# Patient Record
Sex: FEMALE | Race: OTHER | NOT HISPANIC OR LATINO | URBAN - METROPOLITAN AREA
[De-identification: names, ages, dates, MRNs, and addresses within clinical notes are randomized per-mention and may not be internally consistent; named-entity substitution may affect disease eponyms.]

---

## 2017-04-06 ENCOUNTER — OUTPATIENT (OUTPATIENT)
Dept: OUTPATIENT SERVICES | Facility: HOSPITAL | Age: 52
LOS: 1 days | Discharge: HOME | End: 2017-04-06

## 2017-06-27 DIAGNOSIS — R10.11 RIGHT UPPER QUADRANT PAIN: ICD-10-CM

## 2017-06-27 DIAGNOSIS — R53.82 CHRONIC FATIGUE, UNSPECIFIED: ICD-10-CM

## 2017-09-07 ENCOUNTER — OUTPATIENT (OUTPATIENT)
Dept: OUTPATIENT SERVICES | Facility: HOSPITAL | Age: 52
LOS: 1 days | Discharge: HOME | End: 2017-09-07

## 2017-09-08 DIAGNOSIS — R10.11 RIGHT UPPER QUADRANT PAIN: ICD-10-CM

## 2017-09-08 DIAGNOSIS — R53.82 CHRONIC FATIGUE, UNSPECIFIED: ICD-10-CM

## 2018-01-23 ENCOUNTER — OUTPATIENT (OUTPATIENT)
Dept: OUTPATIENT SERVICES | Facility: HOSPITAL | Age: 53
LOS: 1 days | Discharge: HOME | End: 2018-01-23

## 2018-01-23 DIAGNOSIS — Z00.00 ENCOUNTER FOR GENERAL ADULT MEDICAL EXAMINATION WITHOUT ABNORMAL FINDINGS: ICD-10-CM

## 2018-02-27 ENCOUNTER — OUTPATIENT (OUTPATIENT)
Dept: OUTPATIENT SERVICES | Facility: HOSPITAL | Age: 53
LOS: 1 days | Discharge: HOME | End: 2018-02-27

## 2018-02-28 DIAGNOSIS — I11.9 HYPERTENSIVE HEART DISEASE WITHOUT HEART FAILURE: ICD-10-CM

## 2018-02-28 DIAGNOSIS — E11.9 TYPE 2 DIABETES MELLITUS WITHOUT COMPLICATIONS: ICD-10-CM

## 2018-02-28 DIAGNOSIS — R10.11 RIGHT UPPER QUADRANT PAIN: ICD-10-CM

## 2022-04-18 ENCOUNTER — EMERGENCY (EMERGENCY)
Facility: HOSPITAL | Age: 57
LOS: 0 days | Discharge: HOME | End: 2022-04-18
Attending: EMERGENCY MEDICINE | Admitting: EMERGENCY MEDICINE
Payer: COMMERCIAL

## 2022-04-18 VITALS
RESPIRATION RATE: 18 BRPM | OXYGEN SATURATION: 98 % | TEMPERATURE: 100 F | WEIGHT: 179.9 LBS | SYSTOLIC BLOOD PRESSURE: 191 MMHG | DIASTOLIC BLOOD PRESSURE: 75 MMHG | HEART RATE: 88 BPM

## 2022-04-18 DIAGNOSIS — U07.1 COVID-19: ICD-10-CM

## 2022-04-18 DIAGNOSIS — R00.1 BRADYCARDIA, UNSPECIFIED: ICD-10-CM

## 2022-04-18 DIAGNOSIS — Z88.1 ALLERGY STATUS TO OTHER ANTIBIOTIC AGENTS STATUS: ICD-10-CM

## 2022-04-18 DIAGNOSIS — E11.9 TYPE 2 DIABETES MELLITUS WITHOUT COMPLICATIONS: ICD-10-CM

## 2022-04-18 DIAGNOSIS — Z87.39 PERSONAL HISTORY OF OTHER DISEASES OF THE MUSCULOSKELETAL SYSTEM AND CONNECTIVE TISSUE: ICD-10-CM

## 2022-04-18 DIAGNOSIS — Z88.0 ALLERGY STATUS TO PENICILLIN: ICD-10-CM

## 2022-04-18 LAB — SARS-COV-2 RNA SPEC QL NAA+PROBE: DETECTED

## 2022-04-18 PROCEDURE — 93010 ELECTROCARDIOGRAM REPORT: CPT

## 2022-04-18 PROCEDURE — 71045 X-RAY EXAM CHEST 1 VIEW: CPT | Mod: 26

## 2022-04-18 PROCEDURE — 99284 EMERGENCY DEPT VISIT MOD MDM: CPT

## 2022-04-18 RX ORDER — AMLODIPINE BESYLATE 2.5 MG/1
5 TABLET ORAL ONCE
Refills: 0 | Status: COMPLETED | OUTPATIENT
Start: 2022-04-18 | End: 2022-04-18

## 2022-04-18 NOTE — ED PROVIDER NOTE - PATIENT PORTAL LINK FT
You can access the FollowMyHealth Patient Portal offered by [facility] by registering at the following website: [email or web address]. By joining [employer]’s FollowMyHealth portal, you will also be able to view your health information using other applications (apps) compatible with our system.

## 2022-04-18 NOTE — ED PROVIDER NOTE - NSFOLLOWUPINSTRUCTIONS_ED_ALL_ED_FT
Cough, Adult      A cough helps to clear your throat and lungs. A cough may be a sign of an illness or another medical condition.    An acute cough may only last 2–3 weeks, while a chronic cough may last 8 or more weeks.    Many things can cause a cough. They include:  •Germs (viruses or bacteria) that attack the airway.      •Breathing in things that bother (irritate) your lungs.      •Allergies.      •Asthma.      •Mucus that runs down the back of your throat (postnasal drip).      •Smoking.      •Acid backing up from the stomach into the tube that moves food from the mouth to the stomach (gastroesophageal reflux).      •Some medicines.      •Lung problems.      •Other medical conditions, such as heart failure or a blood clot in the lung (pulmonary embolism).        Follow these instructions at home:    Medicines     •Take over-the-counter and prescription medicines only as told by your doctor.      •Talk with your doctor before you take medicines that stop a cough (cough suppressants).        Lifestyle      • Do not smoke, and try not to be around smoke. Do not use any products that contain nicotine or tobacco, such as cigarettes, e-cigarettes, and chewing tobacco. If you need help quitting, ask your doctor.      •Drink enough fluid to keep your pee (urine) pale yellow.      •Avoid caffeine.      • Do not drink alcohol if your doctor tells you not to drink.        General instructions      •Watch for any changes in your cough. Tell your doctor about them.      •Always cover your mouth when you cough.      •Stay away from things that make you cough, such as perfume, candles, campfire smoke, or cleaning products.      •If the air is dry, use a cool mist vaporizer or humidifier in your home.      •If your cough is worse at night, try using extra pillows to raise your head up higher while you sleep.      •Rest as needed.      •Keep all follow-up visits as told by your doctor. This is important.        Contact a doctor if:    •You have new symptoms.      •You cough up pus.      •Your cough does not get better after 2–3 weeks, or your cough gets worse.      •Cough medicine does not help your cough and you are not sleeping well.      •You have pain that gets worse or pain that is not helped with medicine.      •You have a fever.      •You are losing weight and you do not know why.      •You have night sweats.        Get help right away if:    •You cough up blood.      •You have trouble breathing.      •Your heartbeat is very fast.      These symptoms may be an emergency. Do not wait to see if the symptoms will go away. Get medical help right away. Call your local emergency services (911 in the [place] Do not drive yourself to the hospital.       Summary    •A cough helps to clear your throat and lungs. Many things can cause a cough.      •Take over-the-counter and prescription medicines only as told by your doctor.      •Always cover your mouth when you cough.      •Contact a doctor if you have new symptoms or you have a cough that does not get better or gets worse.      This information is not intended to replace advice given to you by your health care provider. Make sure you discuss any questions you have with your health care provider.

## 2022-04-18 NOTE — ED PROVIDER NOTE - CHILD ABUSE FACILITY
Quality 226: Preventive Care And Screening: Tobacco Use: Screening And Cessation Intervention: Patient screened for tobacco use and is an ex/non-smoker Quality 131: Pain Assessment And Follow-Up: Pain assessment using a standardized tool is documented as negative, no follow-up plan required Quality 110: Preventive Care And Screening: Influenza Immunization: Influenza Immunization not Administered because Patient Refused. Detail Level: [facility] Quality 130: Documentation Of Current Medications In The Medical Record: Current Medications Documented

## 2022-04-18 NOTE — ED PROVIDER NOTE - CARE PLAN
Principal Discharge DX:	Cough   1 Principal Discharge DX:	Cough  Secondary Diagnosis:	2019 novel coronavirus disease (COVID-19)

## 2022-04-18 NOTE — ED PROVIDER NOTE - NS ED ROS FT
CONSTITUTIONAL: No weakness, fevers or chills, no weight loss   EYES/ENT: No visual changes;  + throat pain   NECK: No pain or stiffness  RESPIRATORY: + cough, nowheezing, hemoptysis; No shortness of breath  CARDIOVASCULAR: No chest pain or palpitations  GASTROINTESTINAL: No abdominal or epigastric pain. No nausea, vomiting, or hematemesis; No diarrhea or constipation. No melena or hematochezia.  GENITOURINARY: No discharge, hematuria  NEUROLOGICAL: No numbness or weakness  All other review of systems is negative unless indicated above.

## 2022-04-18 NOTE — ED PROVIDER NOTE - NSICDXPASTMEDICALHX_GEN_ALL_CORE_FT
PAST MEDICAL HISTORY:  2019 novel coronavirus disease (COVID-19)     Diabetes mellitus     Lupus erythematosus

## 2022-04-18 NOTE — ED PROVIDER NOTE - OBJECTIVE STATEMENT
57 Yo F with prior medical history of DM, lupus here for assessment of cough which started 5 days ago. She had a positive exposure to COVID19 with a family member who lives in same house. Cough is continuous, uncomfortable and is associated with clear non bloody, non purulent phelgm. Patient has just been taking Tylenol at home. She denied any fevers, chills, NV, or any other symptoms. She had covid infection in 2020, ending up needing oxygen and steroids. Her mom passed away in the same phase. She is vaccinated x 3 against covid. Patient requesting for swab for work.

## 2022-04-18 NOTE — ED PROVIDER NOTE - CLINICAL SUMMARY MEDICAL DECISION MAKING FREE TEXT BOX
Patient has no shortness of breath.  Pulse ox is 98% on room air.  In my opinion, outpatient management is medically appropriate.

## 2022-04-18 NOTE — ED PROVIDER NOTE - ATTENDING CONTRIBUTION TO CARE
Patient complains of cough.  She had an exposure to COVID.  Vital signs noted.  Chest is clear.  NAD.  X-ray reviewed.  No infiltrate seen.

## 2022-04-18 NOTE — ED PROVIDER NOTE - PHYSICAL EXAMINATION
GENERAL: NAD, lying in bed comfortably  HEAD:  Atraumatic, Normocephalic  EYES: EOMI, PERRLA, conjunctiva and sclera clear  ENT: Moist mucous membranes  NECK: Supple, No JVD  CHEST/LUNG: decreased breath sounds bilaterally; No rales, rhonchi, wheezing, or rubs. Unlabored respirations  HEART: Regular rate and rhythm; No murmurs, rubs, or gallops  ABDOMEN: Bowel sounds present; Soft, Nontender,  NERVOUS SYSTEM:  Alert & Oriented X3, speech clear. No deficits   MSK: FROM all 4 extremities, full and equal strength  SKIN: No rashes or lesions

## 2022-04-19 ENCOUNTER — TRANSCRIPTION ENCOUNTER (OUTPATIENT)
Age: 57
End: 2022-04-19

## 2022-04-20 ENCOUNTER — EMERGENCY (EMERGENCY)
Facility: HOSPITAL | Age: 57
LOS: 1 days | Discharge: HOME | End: 2022-04-20
Admitting: EMERGENCY MEDICINE
Payer: COMMERCIAL

## 2022-04-20 ENCOUNTER — APPOINTMENT (OUTPATIENT)
Age: 57
End: 2022-04-20

## 2022-04-20 VITALS
HEART RATE: 106 BPM | DIASTOLIC BLOOD PRESSURE: 86 MMHG | TEMPERATURE: 98 F | RESPIRATION RATE: 17 BRPM | OXYGEN SATURATION: 97 % | SYSTOLIC BLOOD PRESSURE: 158 MMHG

## 2022-04-20 VITALS
SYSTOLIC BLOOD PRESSURE: 153 MMHG | TEMPERATURE: 98 F | OXYGEN SATURATION: 96 % | RESPIRATION RATE: 18 BRPM | HEART RATE: 62 BPM | DIASTOLIC BLOOD PRESSURE: 91 MMHG

## 2022-04-20 DIAGNOSIS — R50.9 FEVER, UNSPECIFIED: ICD-10-CM

## 2022-04-20 DIAGNOSIS — E11.9 TYPE 2 DIABETES MELLITUS WITHOUT COMPLICATIONS: ICD-10-CM

## 2022-04-20 DIAGNOSIS — U07.1 COVID-19: ICD-10-CM

## 2022-04-20 PROBLEM — L93.0 DISCOID LUPUS ERYTHEMATOSUS: Chronic | Status: ACTIVE | Noted: 2022-04-18

## 2022-04-20 PROCEDURE — L9998: CPT

## 2022-04-20 RX ORDER — BEBTELOVIMAB 87.5 MG/ML
175 INJECTION, SOLUTION INTRAVENOUS ONCE
Refills: 0 | Status: COMPLETED | OUTPATIENT
Start: 2022-04-20 | End: 2022-04-20

## 2022-04-20 RX ADMIN — BEBTELOVIMAB 175 MILLIGRAM(S): 87.5 INJECTION, SOLUTION INTRAVENOUS at 16:06

## 2022-04-20 NOTE — CHART NOTE - NSCHARTNOTEFT_GEN_A_CORE
CC: Monoclonal Antibody Infusion - COVID 19 Positive or significant COVID exposure       History: Patient presents for infusion of Bebtelovimab monoclonal antibody infusion. Patient has been screened and was deemed to be a candidate.    Date tested COVID positive: 4/18/22      COVID Symptoms:  Date of onset: 4/15/22  * Fever- YES  * Cough- YES  * SOB- NONE  * General Malaise- YES  * Headache- NONE  * Taste / Smell changes- NONE   * nausea / vomiting- YES  * congestion- NONE      Risk Profile includes:   * DM-  YES  * HTN- NO  * Cancer-  NO  * Kidney Disease-  NO  * Obesity- NO  * Asthma / lung disease- NO  * Past Cancer- NO  * OTHER: LUPUS      Vaccination Status: Moderna x 3  Booster Vaccine: 12/8/2021      Bactrim (Hives)  penicillin (Hives)      bebtelovimab (EUA) Injectable 175 milliGRAM(s) IV Push once        PMHx:  MAB    SysAdmin_VisitLink          T(C): 36.9 (04-20-22 @ 15:32), Max: 36.9 (04-20-22 @ 15:32)  HR: 62 (04-20-22 @ 15:32) (62 - 62)  BP: 153/91 (04-20-22 @ 15:32) (153/91 - 153/91)  RR: 18 (04-20-22 @ 15:32) (18 - 18)  SpO2: 96% (04-20-22 @ 15:32) (96% - 96%)      PE- Well developed, well-nourish, resting comfortably in NAD.   Cardiac- +regular rate and rhythm.   Pulm- Normal rate.  No distress.  Abd soft and non-tender.   Neuro- A&Ox3, no gross sensory deficits to light touch or motor weaknesses.   Vasc- No peripheral edema or venous stasis noted.  Skin- No ecchymosis or bleeding.  MS- No bony tenderness       ASSESSMENT:  Pt is a 56y year old Female COVID positive and symptomatic who was referred to the infusion center for Monoclonal antibody infusion (Bebtelovimab).      PLAN:  - infusion procedure explained to patient   - Consent for monoclonal antibody infusion obtained   - Risk & benefits discussed/all questions answered  - infusion of Bebtelovimab  - will observe patient for one hour post infusion  and then if stable discharge home with outpatient follow up as planned by PMD.    POST INFUSION ASSESSMENT:   DISCHARGE at approximately  1 hour    - Patient tolerated infusion well denies complaints of chest pain, SOB, dizziness or palpitations  - VSS for discharge home  - D/C instructions given/ fact sheet included.  - Patient to follow-up with PCP as needed.

## 2022-04-20 NOTE — CHART NOTE - HIGH RISK FACTORS
Diabetes Mellitus/Immune Suppressive Disease Diabetes Mellitus/Immune Suppressive Disease/Receiving Immune Suppressive Treatment

## 2023-03-22 ENCOUNTER — EMERGENCY (EMERGENCY)
Facility: HOSPITAL | Age: 58
LOS: 0 days | Discharge: HOME | End: 2023-03-22
Attending: EMERGENCY MEDICINE
Payer: COMMERCIAL

## 2023-03-22 VITALS
DIASTOLIC BLOOD PRESSURE: 77 MMHG | TEMPERATURE: 99 F | SYSTOLIC BLOOD PRESSURE: 166 MMHG | HEART RATE: 107 BPM | WEIGHT: 192.02 LBS | OXYGEN SATURATION: 97 % | RESPIRATION RATE: 19 BRPM | HEIGHT: 65 IN

## 2023-03-22 DIAGNOSIS — J00 ACUTE NASOPHARYNGITIS [COMMON COLD]: ICD-10-CM

## 2023-03-22 DIAGNOSIS — Z88.1 ALLERGY STATUS TO OTHER ANTIBIOTIC AGENTS STATUS: ICD-10-CM

## 2023-03-22 DIAGNOSIS — Z88.0 ALLERGY STATUS TO PENICILLIN: ICD-10-CM

## 2023-03-22 DIAGNOSIS — E11.9 TYPE 2 DIABETES MELLITUS WITHOUT COMPLICATIONS: ICD-10-CM

## 2023-03-22 DIAGNOSIS — I10 ESSENTIAL (PRIMARY) HYPERTENSION: ICD-10-CM

## 2023-03-22 DIAGNOSIS — E78.5 HYPERLIPIDEMIA, UNSPECIFIED: ICD-10-CM

## 2023-03-22 LAB
BASOPHILS # BLD AUTO: 0.01 K/UL — SIGNIFICANT CHANGE UP (ref 0–0.2)
BASOPHILS NFR BLD AUTO: 0.2 % — SIGNIFICANT CHANGE UP (ref 0–1)
EOSINOPHIL # BLD AUTO: 0.05 K/UL — SIGNIFICANT CHANGE UP (ref 0–0.7)
EOSINOPHIL NFR BLD AUTO: 1 % — SIGNIFICANT CHANGE UP (ref 0–8)
GLUCOSE BLDC GLUCOMTR-MCNC: 74 MG/DL — SIGNIFICANT CHANGE UP (ref 70–99)
HCT VFR BLD CALC: 35.5 % — LOW (ref 37–47)
HGB BLD-MCNC: 11.4 G/DL — LOW (ref 12–16)
IMM GRANULOCYTES NFR BLD AUTO: 0.4 % — HIGH (ref 0.1–0.3)
LYMPHOCYTES # BLD AUTO: 1.36 K/UL — SIGNIFICANT CHANGE UP (ref 1.2–3.4)
LYMPHOCYTES # BLD AUTO: 27.3 % — SIGNIFICANT CHANGE UP (ref 20.5–51.1)
MCHC RBC-ENTMCNC: 28.2 PG — SIGNIFICANT CHANGE UP (ref 27–31)
MCHC RBC-ENTMCNC: 32.1 G/DL — SIGNIFICANT CHANGE UP (ref 32–37)
MCV RBC AUTO: 87.9 FL — SIGNIFICANT CHANGE UP (ref 81–99)
MONOCYTES # BLD AUTO: 0.67 K/UL — HIGH (ref 0.1–0.6)
MONOCYTES NFR BLD AUTO: 13.4 % — HIGH (ref 1.7–9.3)
NEUTROPHILS # BLD AUTO: 2.88 K/UL — SIGNIFICANT CHANGE UP (ref 1.4–6.5)
NEUTROPHILS NFR BLD AUTO: 57.7 % — SIGNIFICANT CHANGE UP (ref 42.2–75.2)
NRBC # BLD: 0 /100 WBCS — SIGNIFICANT CHANGE UP (ref 0–0)
PLATELET # BLD AUTO: 260 K/UL — SIGNIFICANT CHANGE UP (ref 130–400)
RBC # BLD: 4.04 M/UL — LOW (ref 4.2–5.4)
RBC # FLD: 14.1 % — SIGNIFICANT CHANGE UP (ref 11.5–14.5)
WBC # BLD: 4.99 K/UL — SIGNIFICANT CHANGE UP (ref 4.8–10.8)
WBC # FLD AUTO: 4.99 K/UL — SIGNIFICANT CHANGE UP (ref 4.8–10.8)

## 2023-03-22 PROCEDURE — 36415 COLL VENOUS BLD VENIPUNCTURE: CPT

## 2023-03-22 PROCEDURE — 82962 GLUCOSE BLOOD TEST: CPT

## 2023-03-22 PROCEDURE — 99284 EMERGENCY DEPT VISIT MOD MDM: CPT

## 2023-03-22 PROCEDURE — 99283 EMERGENCY DEPT VISIT LOW MDM: CPT

## 2023-03-22 PROCEDURE — 85025 COMPLETE CBC W/AUTO DIFF WBC: CPT

## 2023-03-22 NOTE — ED PROVIDER NOTE - PATIENT PORTAL LINK FT
You can access the FollowMyHealth Patient Portal offered by [facility] by registering at the following website: [email or web address]. By joining [employer]’s FollowMyHealth portal, you will also be able to view your health information using other applications (apps) compatible with our system.

## 2023-03-22 NOTE — ED PROVIDER NOTE - CLINICAL SUMMARY MEDICAL DECISION MAKING FREE TEXT BOX
Patient here feeling cold for 1 month exam is unremarkable.  Advised outpatient follow-up with PMD for further work-up.  Here screening hemoglobin 11.4.

## 2023-03-22 NOTE — ED PROVIDER NOTE - OBJECTIVE STATEMENT
Patient is a 57-year-old female past history of hypertension, diabetes, hyperlipidemia here for evaluation of feeling cold for 1 month.  Patient denies fever, chills, chest pain, shortness of breath, dysuria, hematuria.

## 2023-03-22 NOTE — ED PROVIDER NOTE - NS ED ATTENDING STATEMENT MOD
This was a shared visit with the ANURAG. I reviewed and verified the documentation and independently performed the documented:

## 2023-03-22 NOTE — ED ADULT NURSE NOTE - CHIEF COMPLAINT QUOTE
Pt states " I have been feeling cold for the past month. I notice it more when my sugar is low. PMD said to go to emergency room"

## 2023-11-13 ENCOUNTER — OUTPATIENT (OUTPATIENT)
Dept: OUTPATIENT SERVICES | Facility: HOSPITAL | Age: 58
LOS: 1 days | End: 2023-11-13
Payer: COMMERCIAL

## 2023-11-13 VITALS
HEIGHT: 65 IN | OXYGEN SATURATION: 97 % | TEMPERATURE: 98 F | HEART RATE: 71 BPM | SYSTOLIC BLOOD PRESSURE: 146 MMHG | RESPIRATION RATE: 16 BRPM | DIASTOLIC BLOOD PRESSURE: 86 MMHG

## 2023-11-13 DIAGNOSIS — Z90.710 ACQUIRED ABSENCE OF BOTH CERVIX AND UTERUS: Chronic | ICD-10-CM

## 2023-11-13 DIAGNOSIS — Z01.818 ENCOUNTER FOR OTHER PREPROCEDURAL EXAMINATION: ICD-10-CM

## 2023-11-13 DIAGNOSIS — Z98.890 OTHER SPECIFIED POSTPROCEDURAL STATES: Chronic | ICD-10-CM

## 2023-11-13 DIAGNOSIS — D17.1 BENIGN LIPOMATOUS NEOPLASM OF SKIN AND SUBCUTANEOUS TISSUE OF TRUNK: ICD-10-CM

## 2023-11-13 LAB
A1C WITH ESTIMATED AVERAGE GLUCOSE RESULT: 6.9 % — HIGH (ref 4–5.6)
A1C WITH ESTIMATED AVERAGE GLUCOSE RESULT: 6.9 % — HIGH (ref 4–5.6)
ALBUMIN SERPL ELPH-MCNC: 4.1 G/DL — SIGNIFICANT CHANGE UP (ref 3.5–5.2)
ALBUMIN SERPL ELPH-MCNC: 4.1 G/DL — SIGNIFICANT CHANGE UP (ref 3.5–5.2)
ALP SERPL-CCNC: 106 U/L — SIGNIFICANT CHANGE UP (ref 30–115)
ALP SERPL-CCNC: 106 U/L — SIGNIFICANT CHANGE UP (ref 30–115)
ALT FLD-CCNC: 31 U/L — SIGNIFICANT CHANGE UP (ref 0–41)
ALT FLD-CCNC: 31 U/L — SIGNIFICANT CHANGE UP (ref 0–41)
ANION GAP SERPL CALC-SCNC: 9 MMOL/L — SIGNIFICANT CHANGE UP (ref 7–14)
ANION GAP SERPL CALC-SCNC: 9 MMOL/L — SIGNIFICANT CHANGE UP (ref 7–14)
AST SERPL-CCNC: 34 U/L — SIGNIFICANT CHANGE UP (ref 0–41)
AST SERPL-CCNC: 34 U/L — SIGNIFICANT CHANGE UP (ref 0–41)
BASOPHILS # BLD AUTO: 0.01 K/UL — SIGNIFICANT CHANGE UP (ref 0–0.2)
BASOPHILS # BLD AUTO: 0.01 K/UL — SIGNIFICANT CHANGE UP (ref 0–0.2)
BASOPHILS NFR BLD AUTO: 0.2 % — SIGNIFICANT CHANGE UP (ref 0–1)
BASOPHILS NFR BLD AUTO: 0.2 % — SIGNIFICANT CHANGE UP (ref 0–1)
BILIRUB SERPL-MCNC: 0.2 MG/DL — SIGNIFICANT CHANGE UP (ref 0.2–1.2)
BILIRUB SERPL-MCNC: 0.2 MG/DL — SIGNIFICANT CHANGE UP (ref 0.2–1.2)
BUN SERPL-MCNC: 18 MG/DL — SIGNIFICANT CHANGE UP (ref 10–20)
BUN SERPL-MCNC: 18 MG/DL — SIGNIFICANT CHANGE UP (ref 10–20)
CALCIUM SERPL-MCNC: 9.8 MG/DL — SIGNIFICANT CHANGE UP (ref 8.4–10.5)
CALCIUM SERPL-MCNC: 9.8 MG/DL — SIGNIFICANT CHANGE UP (ref 8.4–10.5)
CHLORIDE SERPL-SCNC: 104 MMOL/L — SIGNIFICANT CHANGE UP (ref 98–110)
CHLORIDE SERPL-SCNC: 104 MMOL/L — SIGNIFICANT CHANGE UP (ref 98–110)
CO2 SERPL-SCNC: 29 MMOL/L — SIGNIFICANT CHANGE UP (ref 17–32)
CO2 SERPL-SCNC: 29 MMOL/L — SIGNIFICANT CHANGE UP (ref 17–32)
CREAT SERPL-MCNC: 1.1 MG/DL — SIGNIFICANT CHANGE UP (ref 0.7–1.5)
CREAT SERPL-MCNC: 1.1 MG/DL — SIGNIFICANT CHANGE UP (ref 0.7–1.5)
EGFR: 58 ML/MIN/1.73M2 — LOW
EGFR: 58 ML/MIN/1.73M2 — LOW
EOSINOPHIL # BLD AUTO: 0.01 K/UL — SIGNIFICANT CHANGE UP (ref 0–0.7)
EOSINOPHIL # BLD AUTO: 0.01 K/UL — SIGNIFICANT CHANGE UP (ref 0–0.7)
EOSINOPHIL NFR BLD AUTO: 0.2 % — SIGNIFICANT CHANGE UP (ref 0–8)
EOSINOPHIL NFR BLD AUTO: 0.2 % — SIGNIFICANT CHANGE UP (ref 0–8)
ESTIMATED AVERAGE GLUCOSE: 151 MG/DL — HIGH (ref 68–114)
ESTIMATED AVERAGE GLUCOSE: 151 MG/DL — HIGH (ref 68–114)
GLUCOSE SERPL-MCNC: 78 MG/DL — SIGNIFICANT CHANGE UP (ref 70–99)
GLUCOSE SERPL-MCNC: 78 MG/DL — SIGNIFICANT CHANGE UP (ref 70–99)
HCT VFR BLD CALC: 41.9 % — SIGNIFICANT CHANGE UP (ref 37–47)
HCT VFR BLD CALC: 41.9 % — SIGNIFICANT CHANGE UP (ref 37–47)
HGB BLD-MCNC: 13.6 G/DL — SIGNIFICANT CHANGE UP (ref 12–16)
HGB BLD-MCNC: 13.6 G/DL — SIGNIFICANT CHANGE UP (ref 12–16)
IMM GRANULOCYTES NFR BLD AUTO: 0.2 % — SIGNIFICANT CHANGE UP (ref 0.1–0.3)
IMM GRANULOCYTES NFR BLD AUTO: 0.2 % — SIGNIFICANT CHANGE UP (ref 0.1–0.3)
LYMPHOCYTES # BLD AUTO: 0.91 K/UL — LOW (ref 1.2–3.4)
LYMPHOCYTES # BLD AUTO: 0.91 K/UL — LOW (ref 1.2–3.4)
LYMPHOCYTES # BLD AUTO: 18.2 % — LOW (ref 20.5–51.1)
LYMPHOCYTES # BLD AUTO: 18.2 % — LOW (ref 20.5–51.1)
MCHC RBC-ENTMCNC: 28.8 PG — SIGNIFICANT CHANGE UP (ref 27–31)
MCHC RBC-ENTMCNC: 28.8 PG — SIGNIFICANT CHANGE UP (ref 27–31)
MCHC RBC-ENTMCNC: 32.5 G/DL — SIGNIFICANT CHANGE UP (ref 32–37)
MCHC RBC-ENTMCNC: 32.5 G/DL — SIGNIFICANT CHANGE UP (ref 32–37)
MCV RBC AUTO: 88.8 FL — SIGNIFICANT CHANGE UP (ref 81–99)
MCV RBC AUTO: 88.8 FL — SIGNIFICANT CHANGE UP (ref 81–99)
MONOCYTES # BLD AUTO: 0.42 K/UL — SIGNIFICANT CHANGE UP (ref 0.1–0.6)
MONOCYTES # BLD AUTO: 0.42 K/UL — SIGNIFICANT CHANGE UP (ref 0.1–0.6)
MONOCYTES NFR BLD AUTO: 8.4 % — SIGNIFICANT CHANGE UP (ref 1.7–9.3)
MONOCYTES NFR BLD AUTO: 8.4 % — SIGNIFICANT CHANGE UP (ref 1.7–9.3)
NEUTROPHILS # BLD AUTO: 3.65 K/UL — SIGNIFICANT CHANGE UP (ref 1.4–6.5)
NEUTROPHILS # BLD AUTO: 3.65 K/UL — SIGNIFICANT CHANGE UP (ref 1.4–6.5)
NEUTROPHILS NFR BLD AUTO: 72.8 % — SIGNIFICANT CHANGE UP (ref 42.2–75.2)
NEUTROPHILS NFR BLD AUTO: 72.8 % — SIGNIFICANT CHANGE UP (ref 42.2–75.2)
NRBC # BLD: 0 /100 WBCS — SIGNIFICANT CHANGE UP (ref 0–0)
NRBC # BLD: 0 /100 WBCS — SIGNIFICANT CHANGE UP (ref 0–0)
PLATELET # BLD AUTO: 226 K/UL — SIGNIFICANT CHANGE UP (ref 130–400)
PLATELET # BLD AUTO: 226 K/UL — SIGNIFICANT CHANGE UP (ref 130–400)
PMV BLD: 10.9 FL — HIGH (ref 7.4–10.4)
PMV BLD: 10.9 FL — HIGH (ref 7.4–10.4)
POTASSIUM SERPL-MCNC: 5.2 MMOL/L — HIGH (ref 3.5–5)
POTASSIUM SERPL-MCNC: 5.2 MMOL/L — HIGH (ref 3.5–5)
POTASSIUM SERPL-SCNC: 5.2 MMOL/L — HIGH (ref 3.5–5)
POTASSIUM SERPL-SCNC: 5.2 MMOL/L — HIGH (ref 3.5–5)
PROT SERPL-MCNC: 7.3 G/DL — SIGNIFICANT CHANGE UP (ref 6–8)
PROT SERPL-MCNC: 7.3 G/DL — SIGNIFICANT CHANGE UP (ref 6–8)
RBC # BLD: 4.72 M/UL — SIGNIFICANT CHANGE UP (ref 4.2–5.4)
RBC # BLD: 4.72 M/UL — SIGNIFICANT CHANGE UP (ref 4.2–5.4)
RBC # FLD: 14.1 % — SIGNIFICANT CHANGE UP (ref 11.5–14.5)
RBC # FLD: 14.1 % — SIGNIFICANT CHANGE UP (ref 11.5–14.5)
SODIUM SERPL-SCNC: 142 MMOL/L — SIGNIFICANT CHANGE UP (ref 135–146)
SODIUM SERPL-SCNC: 142 MMOL/L — SIGNIFICANT CHANGE UP (ref 135–146)
WBC # BLD: 5.01 K/UL — SIGNIFICANT CHANGE UP (ref 4.8–10.8)
WBC # BLD: 5.01 K/UL — SIGNIFICANT CHANGE UP (ref 4.8–10.8)
WBC # FLD AUTO: 5.01 K/UL — SIGNIFICANT CHANGE UP (ref 4.8–10.8)
WBC # FLD AUTO: 5.01 K/UL — SIGNIFICANT CHANGE UP (ref 4.8–10.8)

## 2023-11-13 PROCEDURE — 99214 OFFICE O/P EST MOD 30 MIN: CPT | Mod: 25

## 2023-11-13 PROCEDURE — 80053 COMPREHEN METABOLIC PANEL: CPT

## 2023-11-13 PROCEDURE — 85025 COMPLETE CBC W/AUTO DIFF WBC: CPT

## 2023-11-13 PROCEDURE — 93005 ELECTROCARDIOGRAM TRACING: CPT

## 2023-11-13 PROCEDURE — 83036 HEMOGLOBIN GLYCOSYLATED A1C: CPT

## 2023-11-13 PROCEDURE — 93010 ELECTROCARDIOGRAM REPORT: CPT

## 2023-11-13 PROCEDURE — 36415 COLL VENOUS BLD VENIPUNCTURE: CPT

## 2023-11-13 NOTE — H&P PST ADULT - NSICDXPASTMEDICALHX_GEN_ALL_CORE_FT
PAST MEDICAL HISTORY:  Diabetes mellitus     H/O rheumatoid arthritis     Hypertension     Lupus erythematosus

## 2023-11-13 NOTE — H&P PST ADULT - REASON FOR ADMISSION
57 y/o female presents to PAST in preparation for excision of 10cm lipomatous mass of the upper back in [facility] under LSB with Dr. Villalta on 11/24/23

## 2023-11-13 NOTE — H&P PST ADULT - HISTORY OF PRESENT ILLNESS
57 y/o female presents to PAST in preparation for excision of 10cm lipomatous mass of the upper back in [facility] under LSB with Dr. Villalta on 11/24/23     Pt with mass on her back for the past few years that has increased in size now at 10 cm. Pt states that mass is causing pain of 9/10 aching in nature. Has difficulty in laying down due to pain and discomfort. Pt now for above procedure    PATIENT CURRENTLY DENIES CHEST PAIN  SHORTNESS OF BREATH  PALPITATIONS,  DYSURIA, OR UPPER RESPIRATORY INFECTION IN PAST 2 WEEKS  Patient verbalized understanding of instructions and was given the opportunity to ask questions and have them answered.  As per patient, this is their complete medical and surgical history, including medications both prescribed or over the counter.  written and verbal instructions with teach back on chlorhexidine shampoo provided,  pt verbalized understanding with returned demonstration    Anesthesia Alert  NO--Difficult Airway  NO--History of neck surgery or radiation  NO--Limited ROM of neck  NO--History of Malignant hyperthermia  NO--Personal or family history of Pseudocholinesterase deficiency.  NO--Prior Anesthesia Complication  NO--Latex Allergy  NO--Loose teeth  Yes--History of Rheumatoid Arthritis-full neck rom  NO--SHELDON  NO--Bleeding risk-ASA 81mg   NO--Other_____  Mallampati airway: Class IV       [facility] Activity Status Index (DASI) 11/13/2023    RESULT SUMMARY:  50.2 points  The higher the score (maximum 58.2), the higher the functional status.    8.91 METs    INPUTS:  Take care of self —> 2.75 = Yes  Walk indoors —> 1.75 = Yes  Walk 1&ndash;2 blocks on level ground —> 2.75 = Yes  Climb a flight of stairs or walk up a hill —> 5.5 = Yes  Run a short distance —> 8 = Yes  Do light work around the house —> 2.7 = Yes  Do moderate work around the house —> 3.5 = Yes  Do heavy work around the house —> 0 = No  Do yardwork —> 4.5 = Yes  Have sexual relations —> 5.25 = Yes  Participate in moderate recreational activities —> 6 = Yes  Participate in strenuous sports —> 7.5 = Yes    Revised Cardiac Risk Index for Pre-Operative Risk 11/13/2023      RESULT SUMMARY:  1 points  Class II Risk    6.0 %  30-day risk of death, MI, or cardiac arrest    INPUTS:  Elevated-risk surgery —> 0 = No  History of ischemic heart disease —> 0 = No  History of congestive heart failure —> 0 = No  History of cerebrovascular disease —> 0 = No  Pre-operative treatment with insulin —> 1 = Yes  Pre-operative creatinine >2 mg/dL / 176.8 µmol/L —> 0 = No    Lipoma of torso    Encounter for other preprocedural examination    Diabetes mellitus    Lupus erythematosus    2019 novel coronavirus disease (COVID-19) [address]    SysAdmin_VstLnk

## 2023-11-13 NOTE — H&P PST ADULT - NSICDXFAMILYHX_GEN_ALL_CORE_FT
FAMILY HISTORY:  Mother  Still living? Unknown  Family history of diabetes mellitus (DM), Age at diagnosis: Age Unknown  FH: heart disease, Age at diagnosis: Age Unknown

## 2023-11-14 DIAGNOSIS — Z01.818 ENCOUNTER FOR OTHER PREPROCEDURAL EXAMINATION: ICD-10-CM

## 2023-11-14 DIAGNOSIS — D17.1 BENIGN LIPOMATOUS NEOPLASM OF SKIN AND SUBCUTANEOUS TISSUE OF TRUNK: ICD-10-CM

## 2023-11-17 PROBLEM — Z00.00 ENCOUNTER FOR PREVENTIVE HEALTH EXAMINATION: Status: ACTIVE | Noted: 2023-11-17

## 2023-11-20 ENCOUNTER — APPOINTMENT (OUTPATIENT)
Dept: CARDIOLOGY | Facility: CLINIC | Age: 58
End: 2023-11-20
Payer: COMMERCIAL

## 2023-11-20 VITALS
WEIGHT: 185 LBS | SYSTOLIC BLOOD PRESSURE: 130 MMHG | DIASTOLIC BLOOD PRESSURE: 80 MMHG | BODY MASS INDEX: 30.82 KG/M2 | HEIGHT: 65 IN

## 2023-11-20 DIAGNOSIS — Z82.49 FAMILY HISTORY OF ISCHEMIC HEART DISEASE AND OTHER DISEASES OF THE CIRCULATORY SYSTEM: ICD-10-CM

## 2023-11-20 DIAGNOSIS — Z78.9 OTHER SPECIFIED HEALTH STATUS: ICD-10-CM

## 2023-11-20 PROBLEM — Z87.39 PERSONAL HISTORY OF OTHER DISEASES OF THE MUSCULOSKELETAL SYSTEM AND CONNECTIVE TISSUE: Chronic | Status: ACTIVE | Noted: 2023-11-13

## 2023-11-20 PROBLEM — I10 ESSENTIAL (PRIMARY) HYPERTENSION: Chronic | Status: ACTIVE | Noted: 2023-11-13

## 2023-11-20 PROCEDURE — 99204 OFFICE O/P NEW MOD 45 MIN: CPT

## 2023-11-20 PROCEDURE — 93000 ELECTROCARDIOGRAM COMPLETE: CPT

## 2023-11-20 RX ORDER — METFORMIN HYDROCHLORIDE 1000 MG/1
1000 TABLET, COATED ORAL
Refills: 0 | Status: ACTIVE | COMMUNITY
Start: 2023-11-20

## 2023-11-20 RX ORDER — INSULIN LISPRO 100 [IU]/ML
100 INJECTION, SOLUTION INTRAVENOUS; SUBCUTANEOUS
Refills: 0 | Status: ACTIVE | COMMUNITY
Start: 2023-11-20

## 2023-11-20 RX ORDER — PIOGLITAZONE 30 MG/1
30 TABLET ORAL
Refills: 0 | Status: ACTIVE | COMMUNITY
Start: 2023-11-20

## 2023-11-20 RX ORDER — PREDNISONE 1 MG/1
1 TABLET ORAL DAILY
Refills: 0 | Status: ACTIVE | COMMUNITY
Start: 2023-11-20

## 2023-11-20 RX ORDER — SIMVASTATIN 20 MG/1
20 TABLET, FILM COATED ORAL
Refills: 0 | Status: ACTIVE | COMMUNITY
Start: 2023-11-20

## 2023-11-20 RX ORDER — INSULIN GLARGINE 300 U/ML
300 INJECTION, SOLUTION SUBCUTANEOUS
Refills: 0 | Status: ACTIVE | COMMUNITY
Start: 2023-11-20

## 2023-11-22 NOTE — ASU PATIENT PROFILE, ADULT - CAREGIVER RELATION TO PATIENT
Recent Visits  Date Type Provider Dept   10/19/23 Office Visit Willis Nguyen MD Do [id number]   10/10/23 Office Visit Primo Torrez MD Do [id number] [facility]   10/06/23 Office Visit Primo Torrez MD Do [id number] [facility]   Showing recent visits within past 540 days and meeting all other requirements  Future Appointments  Date Type Provider Dept   01/17/24 Appointment Willis Nguyen MD Do [id number] [facility]   Showing future appointments within next 180 days and meeting all other requirements       Niece

## 2023-11-24 ENCOUNTER — OUTPATIENT (OUTPATIENT)
Dept: OUTPATIENT SERVICES | Facility: HOSPITAL | Age: 58
LOS: 1 days | Discharge: HOME | End: 2023-11-24
Payer: COMMERCIAL

## 2023-11-24 ENCOUNTER — TRANSCRIPTION ENCOUNTER (OUTPATIENT)
Age: 58
End: 2023-11-24

## 2023-11-24 VITALS
RESPIRATION RATE: 14 BRPM | TEMPERATURE: 98 F | HEART RATE: 85 BPM | SYSTOLIC BLOOD PRESSURE: 152 MMHG | DIASTOLIC BLOOD PRESSURE: 68 MMHG | OXYGEN SATURATION: 99 %

## 2023-11-24 VITALS
HEART RATE: 89 BPM | RESPIRATION RATE: 18 BRPM | DIASTOLIC BLOOD PRESSURE: 65 MMHG | SYSTOLIC BLOOD PRESSURE: 153 MMHG | OXYGEN SATURATION: 100 % | HEIGHT: 65 IN | TEMPERATURE: 98 F | WEIGHT: 184.97 LBS

## 2023-11-24 DIAGNOSIS — Z90.710 ACQUIRED ABSENCE OF BOTH CERVIX AND UTERUS: Chronic | ICD-10-CM

## 2023-11-24 DIAGNOSIS — D17.1 BENIGN LIPOMATOUS NEOPLASM OF SKIN AND SUBCUTANEOUS TISSUE OF TRUNK: ICD-10-CM

## 2023-11-24 DIAGNOSIS — Z98.890 OTHER SPECIFIED POSTPROCEDURAL STATES: Chronic | ICD-10-CM

## 2023-11-24 LAB
GLUCOSE BLDC GLUCOMTR-MCNC: 182 MG/DL — HIGH (ref 70–99)
GLUCOSE BLDC GLUCOMTR-MCNC: 182 MG/DL — HIGH (ref 70–99)

## 2023-11-24 PROCEDURE — 82962 GLUCOSE BLOOD TEST: CPT

## 2023-11-24 PROCEDURE — 88304 TISSUE EXAM BY PATHOLOGIST: CPT | Mod: 26

## 2023-11-24 PROCEDURE — 88304 TISSUE EXAM BY PATHOLOGIST: CPT

## 2023-11-24 RX ORDER — METFORMIN HYDROCHLORIDE 850 MG/1
1 TABLET ORAL
Refills: 0 | DISCHARGE

## 2023-11-24 RX ORDER — INSULIN LISPRO 100/ML
12 VIAL (ML) SUBCUTANEOUS
Refills: 0 | DISCHARGE

## 2023-11-24 RX ORDER — EMPAGLIFLOZIN 10 MG/1
1 TABLET, FILM COATED ORAL
Refills: 0 | DISCHARGE

## 2023-11-24 RX ORDER — PIOGLITAZONE HYDROCHLORIDE 15 MG/1
1 TABLET ORAL
Refills: 0 | DISCHARGE

## 2023-11-24 RX ORDER — ACETAMINOPHEN WITH CODEINE 300MG-30MG
1 TABLET ORAL
Qty: 15 | Refills: 0
Start: 2023-11-24

## 2023-11-24 RX ORDER — SODIUM CHLORIDE 9 MG/ML
1000 INJECTION, SOLUTION INTRAVENOUS
Refills: 0 | Status: DISCONTINUED | OUTPATIENT
Start: 2023-11-24 | End: 2023-11-24

## 2023-11-24 RX ORDER — ASPIRIN/CALCIUM CARB/MAGNESIUM 324 MG
1 TABLET ORAL
Refills: 0 | DISCHARGE

## 2023-11-24 RX ORDER — INSULIN GLARGINE 100 [IU]/ML
40 INJECTION, SOLUTION SUBCUTANEOUS
Refills: 0 | DISCHARGE

## 2023-11-24 RX ORDER — CHOLECALCIFEROL (VITAMIN D3) 125 MCG
1 CAPSULE ORAL
Refills: 0 | DISCHARGE

## 2023-11-24 RX ORDER — SIMVASTATIN 20 MG/1
1 TABLET, FILM COATED ORAL
Refills: 0 | DISCHARGE

## 2023-11-24 RX ORDER — HYDROMORPHONE HYDROCHLORIDE 2 MG/ML
0.5 INJECTION INTRAMUSCULAR; INTRAVENOUS; SUBCUTANEOUS
Refills: 0 | Status: DISCONTINUED | OUTPATIENT
Start: 2023-11-24 | End: 2023-11-24

## 2023-11-24 RX ORDER — SEMAGLUTIDE 0.68 MG/ML
2 INJECTION, SOLUTION SUBCUTANEOUS
Refills: 0 | DISCHARGE

## 2023-11-24 NOTE — ASU DISCHARGE PLAN (ADULT/PEDIATRIC) - ASU DC SPECIAL INSTRUCTIONSFT
Follow Up with Dr. Villalta in 2 weeks. Please call office for confirmation of your appointment.    Diet: Resume your regular diet as tolerated    Pain: You can take over the counter medications such as Tylenol, and Ibuprofen for pain control. Please adhere to the instructions on the back of the bottle. If it was discussed that you would be receiving prescription pain medication upon discharge, this prescription will be sent to your pharmacy.    If you develop fevers, chills, worsening pain, increased drainage from the wound, foul smelling drainage from the wound, nausea that won't subside, vomiting, or any other symptoms of concern, please call MD for further advice, evaluation, and/or treatment.    Activity: Ambulate and get out of bed as tolerated, and with assistance if feeling weak. No heavy lifting greater than ten pounds for 6 weeks. You may begin showering tomorrow. Do not submerge your incisions under water including showering or bathing. Do not scrub your surgical incision. Do not peel off steri strips or surgical glue, they will fall off on their own over time.

## 2023-11-24 NOTE — ASU DISCHARGE PLAN (ADULT/PEDIATRIC) - NS MD DC FALL RISK RISK
For information on Fall & Injury Prevention, visit: [email or web address] OR  [email or web address] OR  https://www.cdc.gov/steadi/patient.html

## 2023-11-24 NOTE — ASU DISCHARGE PLAN (ADULT/PEDIATRIC) - CARE PROVIDER_API CALL
[facility]  Surgery  [address] [place] [id number]  Phone: [phone]  Fax: [phone]  Follow Up Time: 2 weeks

## 2023-11-24 NOTE — BRIEF OPERATIVE NOTE - NSICDXBRIEFPROCEDURE_GEN_ALL_CORE_FT
PROCEDURES:  Excision, soft tissue tumor, back, subfascial, 5 cm or greater 24-Nov-2023 13:32:37  Miky Mendez

## 2023-11-24 NOTE — CHART NOTE - NSCHARTNOTEFT_GEN_A_CORE
PACU ANESTHESIA PACU ADMISSION NOTE      Procedure:Excision, soft tissue tumor, back, subfascial, 5 cm or greater      Post op diagnosisLipoma of back        ____ Intubated  TV:______       Rate: ______      FiO2: ______    _x___ Patent Airway    ____ Full return of protective reflexes    ____ Full recovery from anesthesia / sedation to baseline status    Viitals:  see anesthesia record            Mental Status:  __x__ Awake   _____ Alert   _____ Drowsy   _____ Sedated    Nausea/Vomiting: ____ Yes, See Post - Op Orders      x____ No    Pain Scale (0-10): _____    Treatment: ____ None    _x___ See Post - Op/PCA Orders    Post - Operative Fluids:   ____ Oral   _x___ See Post - Op Orders    Plan:         Discharge:   _x___Home       _____Floor         _____Critical Care    _____Other:_________________    Comments: uneventful  perioperative course; no s/s of anesthesia complications noted; D/C home when criteria met

## 2023-11-24 NOTE — PRE-ANESTHESIA EVALUATION ADULT - NSANTHTIREDRD_ENT_A_CORE
Dr Princess Galvan made aware of telephone call from Robby Kussmaul and will call patient with lab results. No

## 2023-11-24 NOTE — BRIEF OPERATIVE NOTE - OPERATION/FINDINGS
7 cm incision made horizontally in Left mid back. Blunt removal of 8x8cm lipoma. Hemostasis achieved. Incision closed with vicryl and monocryl sutures, covered with sterile dressing.

## 2023-11-30 LAB
SURGICAL PATHOLOGY STUDY: SIGNIFICANT CHANGE UP
SURGICAL PATHOLOGY STUDY: SIGNIFICANT CHANGE UP

## 2023-12-01 DIAGNOSIS — Z86.16 PERSONAL HISTORY OF COVID-19: ICD-10-CM

## 2023-12-01 DIAGNOSIS — I10 ESSENTIAL (PRIMARY) HYPERTENSION: ICD-10-CM

## 2023-12-01 DIAGNOSIS — L93.0 DISCOID LUPUS ERYTHEMATOSUS: ICD-10-CM

## 2023-12-01 DIAGNOSIS — Z88.2 ALLERGY STATUS TO SULFONAMIDES: ICD-10-CM

## 2023-12-01 DIAGNOSIS — Z79.85 LONG-TERM (CURRENT) USE OF INJECTABLE NON-INSULIN ANTIDIABETIC DRUGS: ICD-10-CM

## 2023-12-01 DIAGNOSIS — Z79.52 LONG TERM (CURRENT) USE OF SYSTEMIC STEROIDS: ICD-10-CM

## 2023-12-01 DIAGNOSIS — Z79.82 LONG TERM (CURRENT) USE OF ASPIRIN: ICD-10-CM

## 2023-12-01 DIAGNOSIS — Z79.4 LONG TERM (CURRENT) USE OF INSULIN: ICD-10-CM

## 2023-12-01 DIAGNOSIS — Z79.84 LONG TERM (CURRENT) USE OF ORAL HYPOGLYCEMIC DRUGS: ICD-10-CM

## 2023-12-01 DIAGNOSIS — D17.9 BENIGN LIPOMATOUS NEOPLASM, UNSPECIFIED: ICD-10-CM

## 2023-12-01 DIAGNOSIS — Z88.0 ALLERGY STATUS TO PENICILLIN: ICD-10-CM

## 2023-12-01 DIAGNOSIS — E11.9 TYPE 2 DIABETES MELLITUS WITHOUT COMPLICATIONS: ICD-10-CM

## 2023-12-22 ENCOUNTER — APPOINTMENT (OUTPATIENT)
Dept: CARDIOLOGY | Facility: CLINIC | Age: 58
End: 2023-12-22
Payer: COMMERCIAL

## 2023-12-22 PROCEDURE — 93306 TTE W/DOPPLER COMPLETE: CPT

## 2024-04-03 ENCOUNTER — APPOINTMENT (OUTPATIENT)
Dept: CARDIOLOGY | Facility: CLINIC | Age: 59
End: 2024-04-03

## 2024-04-04 ENCOUNTER — APPOINTMENT (OUTPATIENT)
Dept: CARDIOLOGY | Facility: CLINIC | Age: 59
End: 2024-04-04
Payer: COMMERCIAL

## 2024-04-04 VITALS — DIASTOLIC BLOOD PRESSURE: 78 MMHG | SYSTOLIC BLOOD PRESSURE: 132 MMHG

## 2024-04-04 VITALS — HEART RATE: 93 BPM

## 2024-04-04 VITALS — BODY MASS INDEX: 29.29 KG/M2 | SYSTOLIC BLOOD PRESSURE: 160 MMHG | DIASTOLIC BLOOD PRESSURE: 88 MMHG | WEIGHT: 176 LBS

## 2024-04-04 VITALS — HEIGHT: 65 IN | TEMPERATURE: 97.6 F

## 2024-04-04 DIAGNOSIS — I10 ESSENTIAL (PRIMARY) HYPERTENSION: ICD-10-CM

## 2024-04-04 DIAGNOSIS — E11.9 TYPE 2 DIABETES MELLITUS W/OUT COMPLICATIONS: ICD-10-CM

## 2024-04-04 DIAGNOSIS — M32.9 SYSTEMIC LUPUS ERYTHEMATOSUS, UNSPECIFIED: ICD-10-CM

## 2024-04-04 DIAGNOSIS — R94.31 ABNORMAL ELECTROCARDIOGRAM [ECG] [EKG]: ICD-10-CM

## 2024-04-04 PROCEDURE — 93000 ELECTROCARDIOGRAM COMPLETE: CPT

## 2024-04-04 PROCEDURE — 99214 OFFICE O/P EST MOD 30 MIN: CPT

## 2024-04-04 RX ORDER — SEMAGLUTIDE 2.68 MG/ML
8 INJECTION, SOLUTION SUBCUTANEOUS
Refills: 0 | Status: ACTIVE | COMMUNITY

## 2024-04-04 RX ORDER — ENALAPRIL MALEATE 20 MG/1
20 TABLET ORAL DAILY
Qty: 90 | Refills: 3 | Status: ACTIVE | COMMUNITY
Start: 2024-04-04

## 2024-04-04 NOTE — HISTORY OF PRESENT ILLNESS
[FreeTextEntry1] : Ms. Bautista is a 59yo F with PMHx of HTN, DM, lupus who presents for follow up. Her PMD is Dr. Jennifer Moore. 04/04/24-Patient feeling well. Post lipoma removal. She didn't take her pills today.

## 2024-04-04 NOTE — REASON FOR VISIT
[FreeTextEntry1] : Diagnostic Tests:  ------------------------ EK24-NSR. Possible anterior, inferior infarcts.  23-NSR. TWI, inferior, anterolateral.  23-NSR. PRWP. Possible anteroseptal infarct.  22-Sinus tachycardia. LVH. Possible inferior and anterolateral infarcts.  -------------------------- Echo: 23-TTE: EF 60%, no significant valvular disease.

## 2024-04-04 NOTE — ASSESSMENT
[FreeTextEntry1] : Abnormal EKG: Possible old anteroseptal infarct.  -No evidence of previous infarct.   DM: HA1c 6.9% (11/23).  -Continue with metformin, pioglitizone, Toujeo and Humalog.   HTN: BP at goal per ACC/AHA 2018 guidelines -Continue to monitor.   HLD: , TG 82, , LDL 60 (05/22)->, TG 63, HDL 83, LDL 54 (04/23) -Continue with simvastatin.   Follow up in 6 months.

## 2024-10-16 ENCOUNTER — APPOINTMENT (OUTPATIENT)
Dept: CARDIOLOGY | Facility: CLINIC | Age: 59
End: 2024-10-16

## 2024-11-14 NOTE — ED ADULT TRIAGE NOTE - AS TEMP SITE
November 15, 2024     Patient: Yadira Car   YOB: 1966   Date of Visit: 11/14/2024       To Whom It May Concern:    It is my medical opinion that Yadira Car may return to work on 11/20/2024 .           Sincerely,        No name on file    CC: No Recipients   oral

## 2025-04-25 NOTE — ASU PATIENT PROFILE, ADULT - CENTRAL VENOUS CATHETER
Medicare Wellness  Personal Prevention Plan of Service     Date of Office Visit:    Encounter Provider:  Cynthia Reid MD  Place of Service:  [facility]  Patient Name: Angely Mathias  :  1958    As part of the Medicare Wellness portion of your visit today, we are providing you with this personalized preventive plan of services (PPPS). This plan is based upon recommendations of the [place] Preventive Services Task Force (USPSTF) and the Advisory Committee on Immunization Practices (ACIP).    This lists the preventive care services that should be considered, and provides dates of when you are due. Items listed as completed are up-to-date and do not require any further intervention.    Health Maintenance   Topic Date Due    DXA SCAN  2022    TDAP/TD VACCINES (2 - Td or Tdap) 2024    COVID-19 Vaccine (4 - - season) 2024    ANNUAL WELLNESS VISIT  2025    INFLUENZA VACCINE  2025    LIPID PANEL  2026    MAMMOGRAM  2026    COLORECTAL CANCER SCREENING  2033    HEPATITIS C SCREENING  Completed    Pneumococcal Vaccine 50+  Completed    ZOSTER VACCINE  Completed       Orders Placed This Encounter   Procedures    [id number] 8+Oxycodone+Crt-Unbund - Urine, Clean Catch     Release to patient:   Routine Release [[id number]]       No follow-ups on file.           no